# Patient Record
Sex: FEMALE | URBAN - METROPOLITAN AREA
[De-identification: names, ages, dates, MRNs, and addresses within clinical notes are randomized per-mention and may not be internally consistent; named-entity substitution may affect disease eponyms.]

---

## 2023-03-15 ENCOUNTER — TELEPHONE (OUTPATIENT)
Dept: OBGYN CLINIC | Age: 23
End: 2023-03-15

## 2023-03-15 NOTE — TELEPHONE ENCOUNTER
3/15/2023 @4:03PM-Returned phone call to patient regarding an appt for annual exam. Pt requested Dr. Patt Mantilla. Explained to patient, there are no available appts w/Dr. Patt Mantilla at this time and informed patient of our other providers and patient stated she does not want to see a male provider. ww

## 2023-11-03 ENCOUNTER — TELEPHONE (OUTPATIENT)
Age: 23
End: 2023-11-03

## 2023-11-03 NOTE — TELEPHONE ENCOUNTER
Called patient in regards to getting her 11/09/23 appt rescheduled to 11/6 or 11/8  due to  schedule change. No answer, LVM for her to give the office a call back.

## 2023-11-08 ENCOUNTER — OFFICE VISIT (OUTPATIENT)
Age: 23
End: 2023-11-08
Payer: COMMERCIAL

## 2023-11-08 VITALS
DIASTOLIC BLOOD PRESSURE: 73 MMHG | SYSTOLIC BLOOD PRESSURE: 120 MMHG | BODY MASS INDEX: 26.29 KG/M2 | WEIGHT: 154 LBS | HEART RATE: 109 BPM | HEIGHT: 64 IN | RESPIRATION RATE: 16 BRPM | OXYGEN SATURATION: 98 %

## 2023-11-08 DIAGNOSIS — Z12.4 ENCOUNTER FOR SCREENING FOR MALIGNANT NEOPLASM OF CERVIX: ICD-10-CM

## 2023-11-08 DIAGNOSIS — Z01.419 GYNECOLOGIC EXAM NORMAL: Primary | ICD-10-CM

## 2023-11-08 DIAGNOSIS — N89.8 VAGINAL DISCHARGE: ICD-10-CM

## 2023-11-08 PROCEDURE — 99395 PREV VISIT EST AGE 18-39: CPT | Performed by: OBSTETRICS & GYNECOLOGY

## 2023-11-08 RX ORDER — FLUCONAZOLE 150 MG/1
150 TABLET ORAL ONCE
Qty: 1 TABLET | Refills: 0 | Status: SHIPPED | OUTPATIENT
Start: 2023-11-08 | End: 2023-11-08

## 2023-11-10 LAB
., LABCORP: NORMAL
C TRACH RRNA CVX QL NAA+PROBE: NEGATIVE
CYTOLOGIST CVX/VAG CYTO: NORMAL
CYTOLOGY CVX/VAG DOC CYTO: NORMAL
CYTOLOGY CVX/VAG DOC THIN PREP: NORMAL
DX ICD CODE: NORMAL
Lab: NORMAL
N GONORRHOEA RRNA CVX QL NAA+PROBE: NEGATIVE
OTHER STN SPEC: NORMAL
STAT OF ADQ CVX/VAG CYTO-IMP: NORMAL

## 2025-04-24 DIAGNOSIS — J01.90 ACUTE SINUSITIS, RECURRENCE NOT SPECIFIED, UNSPECIFIED LOCATION: Primary | ICD-10-CM

## 2025-04-24 RX ORDER — AZITHROMYCIN 500 MG/1
500 TABLET, FILM COATED ORAL DAILY
Qty: 1 PACKET | Refills: 0 | Status: SHIPPED | OUTPATIENT
Start: 2025-04-24 | End: 2025-04-27

## 2025-04-29 ENCOUNTER — TELEPHONE (OUTPATIENT)
Age: 25
End: 2025-04-29

## 2025-04-29 NOTE — TELEPHONE ENCOUNTER
4/29/2025 @4:13pm-Called and S/W patient regarding message left for a work note from last Thursday.  Pt stated she got in touch with someone and received her note.ww

## 2025-06-05 ENCOUNTER — OFFICE VISIT (OUTPATIENT)
Age: 25
End: 2025-06-05
Payer: COMMERCIAL

## 2025-06-05 VITALS
OXYGEN SATURATION: 99 % | SYSTOLIC BLOOD PRESSURE: 135 MMHG | HEART RATE: 109 BPM | HEIGHT: 64 IN | BODY MASS INDEX: 26.8 KG/M2 | DIASTOLIC BLOOD PRESSURE: 80 MMHG | RESPIRATION RATE: 18 BRPM | WEIGHT: 157 LBS

## 2025-06-05 DIAGNOSIS — Z12.4 ENCOUNTER FOR SCREENING FOR MALIGNANT NEOPLASM OF CERVIX: ICD-10-CM

## 2025-06-05 DIAGNOSIS — Z01.419 GYNECOLOGIC EXAM NORMAL: Primary | ICD-10-CM

## 2025-06-05 DIAGNOSIS — N89.8 VAGINAL DISCHARGE: ICD-10-CM

## 2025-06-05 PROCEDURE — 99395 PREV VISIT EST AGE 18-39: CPT | Performed by: OBSTETRICS & GYNECOLOGY

## 2025-06-05 RX ORDER — FLUCONAZOLE 150 MG/1
150 TABLET ORAL ONCE
Qty: 1 TABLET | Refills: 0 | Status: SHIPPED | OUTPATIENT
Start: 2025-06-05 | End: 2025-06-05

## 2025-06-06 LAB
HAV IGM SERPL QL IA: NEGATIVE
HBV CORE IGM SERPL QL IA: NEGATIVE
HBV SURFACE AG SERPL QL IA: NEGATIVE
HCV AB SERPL QL IA: NON REACTIVE
HCV AB SERPL QL IA: NORMAL
HIV 1+2 AB+HIV1 P24 AG SERPL QL IA: NON REACTIVE

## 2025-06-07 LAB — RPR SER QL: NON REACTIVE

## 2025-06-09 LAB
., LABCORP: NORMAL
C TRACH RRNA CVX QL NAA+PROBE: NEGATIVE
CYTOLOGIST CVX/VAG CYTO: NORMAL
CYTOLOGY CVX/VAG DOC CYTO: NORMAL
CYTOLOGY CVX/VAG DOC THIN PREP: NORMAL
DX ICD CODE: NORMAL
N GONORRHOEA RRNA CVX QL NAA+PROBE: NEGATIVE
OTHER STN SPEC: NORMAL
SERVICE CMNT-IMP: NORMAL
STAT OF ADQ CVX/VAG CYTO-IMP: NORMAL

## 2025-06-18 NOTE — PROGRESS NOTES
ambulating normally.     Psychiatric:   Insight: good judgement.   Mental Status: normal mood and affect and active and alert.   Orientation: to time, place, and person.   Memory: recent memory normal and remote memory normal.     Head: Head: normocephalic and atraumatic.     Neck:   Neck: supple, FROM, trachea midline, and no masses.   Lymph Nodes: no cervical LAD, supraclavicular LAD, axillary LAD, or inguinal LAD.       Lungs:   Respiratory effort: no dyspnea.       Cardiovascular:     Pulses including femoral / pedal: normal throughout.     Breast: Breast: no masses or abnormal secretions and normal appearance.     Abdomen:   no tenderness, guarding, masses, rebound tenderness, or CVA tenderness and non-distended.       Female :   External genitalia: no lesions or rash and normal.   Vagina: moist mucosa;  discharge.   Cervix: no discharge, inflammation, or cervical motion tenderness   Uterus: midline, smooth, and non-tender; normal size   Adnexae: no adnexal mass or tenderness and size WNL.   Bladder and Urethra: normal bladder and urethra (except where noted).     Skin:   Inspection and palpation: no rash, lesions, ulcer, induration, nodules, jaundice, or abnormal nevi and good turgor. Nails: normal.     Back: Thoracolumbar Appearance: normal curvature.     1. Gynecologic exam normal    - PAP IG, CT-NG, rfx Aptima HPV ASCUS (199320) ([facility])  - Hepatitis Panel, Acute  - RPR  - HIV 1/2 Ag/Ab, 4TH Generation,W Rflx Confirm    2. Encounter for screening for malignant neoplasm of cervix    3. Vaginal discharge    - fluconazole (DIFLUCAN) 150 MG tablet; Take 1 tablet by mouth once for 1 dose  Dispense: 1 tablet; Refill: 0         Return in about 1 year (around 6/5/2026) for annual.